# Patient Record
Sex: FEMALE | Race: WHITE
[De-identification: names, ages, dates, MRNs, and addresses within clinical notes are randomized per-mention and may not be internally consistent; named-entity substitution may affect disease eponyms.]

---

## 2017-04-19 ENCOUNTER — HOSPITAL ENCOUNTER (OUTPATIENT)
Dept: HOSPITAL 45 - C.MAMM | Age: 57
Discharge: HOME | End: 2017-04-19
Attending: OBSTETRICS & GYNECOLOGY
Payer: COMMERCIAL

## 2017-04-19 DIAGNOSIS — Z12.31: Primary | ICD-10-CM

## 2017-04-20 NOTE — MAMMOGRAPHY REPORT
BILATERAL DIGITAL SCREENING MAMMOGRAM TOMOSYNTHESIS WITH CAD: 4/19/2017

CLINICAL HISTORY: Routine screening.  Patient has no complaints.  





TECHNIQUE:  Breast tomosynthesis in addition to standard 2D mammography was performed. Current study
 was also evaluated with a Computer Aided Detection (CAD) system.  



COMPARISON: Comparison is made to exams dated:  1/28/2015 mammogram, 1/23/2014 mammogram, 12/5/2012 
mammogram, 10/18/2010 mammogram - [facility], 3/17/2009, and 3/11/2009.   



BREAST COMPOSITION:  There are scattered areas of fibroglandular density in both breasts.  



FINDINGS:  No suspicious masses, calcifications, or areas of architectural distortion are noted in e
ither breast. There has been no significant interval change compared to prior exams.  Small fluctuat
ing partially circumscribed and partially obscured benign-appearing masses are again noted bilateral
ly, and likely represent cysts given the fluctuating nature.







IMPRESSION:  ACR BI-RADS CATEGORY 2: BENIGN

There is no mammographic evidence of malignancy. A 1 year screening mammogram is recommended.  The p
atient will receive written notification of the results.  





Approximately 10% of breast cancers are not detected with mammography. A negative mammographic repor
t should not delay biopsy if a clinically suggestive mass is present.



Caren Hall M.D.          

ah/:4/19/2017 16:43:10  



Imaging Technologist: Ezio CARTER(R)(M), [facility]

letter sent: Normal 1/2  

BI-RADS Code: ACR BI-RADS Category 2: Benign

## 2018-01-22 ENCOUNTER — HOSPITAL ENCOUNTER (OUTPATIENT)
Dept: HOSPITAL 45 - C.PAPS | Age: 58
Discharge: HOME | End: 2018-01-22
Attending: OBSTETRICS & GYNECOLOGY
Payer: COMMERCIAL

## 2018-01-22 DIAGNOSIS — Z01.419: Primary | ICD-10-CM

## 2018-04-24 ENCOUNTER — HOSPITAL ENCOUNTER (OUTPATIENT)
Dept: HOSPITAL 45 - C.MAMM | Age: 58
Discharge: HOME | End: 2018-04-24
Attending: OBSTETRICS & GYNECOLOGY
Payer: COMMERCIAL

## 2018-04-24 DIAGNOSIS — N63.11: ICD-10-CM

## 2018-04-24 DIAGNOSIS — Z12.31: Primary | ICD-10-CM

## 2018-04-25 NOTE — MAMMOGRAPHY REPORT
BILATERAL DIGITAL SCREENING MAMMOGRAM TOMOSYNTHESIS WITH CAD: 4/24/2018

CLINICAL HISTORY: Routine screening.  Patient has no complaints.  





TECHNIQUE:  Breast tomosynthesis in addition to standard 2D mammography was performed. Current study 
was also evaluated with a Computer Aided Detection (CAD) system.  



COMPARISON: Comparison is made to exams dated:  4/19/2017 mammogram, 1/28/2015 mammogram, 1/23/2014 m
ammogram, 12/5/2012 mammogram, 10/18/2010 mammogram - [facility], and 3/11/2009.   




BREAST COMPOSITION:  There are scattered areas of fibroglandular density in both breasts.  



FINDINGS:  There is a dominant 11 mm non-circumscribed mass in the upper outer middle to posterior ri
ght breast, for which additional targeted ultrasound and possible additional mammographic views are r
ecommended.



There are fluctuating masses throughout the remainder of both breasts, mostly decreased throughout th
e left breast, most likely representing fluctuating cysts. No other suspicious mass, architectural di
stortion or cluster of microcalcifications is seen.  



IMPRESSION:  ACR BI-RADS CATEGORY 0: INCOMPLETE EVALUATION:  NEED ADDITIONAL IMAGING EVALUATION

The 11 mm non-circumscribed mass in the right upper outer breast needs additional evaluation.  

The patient will be called to schedule an appointment.  





Approximately 10% of breast cancers are not detected with mammography. A negative mammographic report
 should not delay biopsy if a clinically suggestive mass is present.



Rose Ingram M.D.          

ay/:4/24/2018 16:38:16  



Imaging Technologist: Soumya CARTER(DORIS)(M), [facility]

letter sent: Addl Imaging 0  

BI-RADS Code: ACR BI-RADS Category 0: Incomplete Evaluation:  Need Additional Imaging Evaluation

## 2018-05-07 ENCOUNTER — HOSPITAL ENCOUNTER (OUTPATIENT)
Dept: HOSPITAL 45 - C.MAMM | Age: 58
Discharge: HOME | End: 2018-05-07
Attending: OBSTETRICS & GYNECOLOGY
Payer: COMMERCIAL

## 2018-05-07 DIAGNOSIS — N63.10: Primary | ICD-10-CM

## 2018-05-07 NOTE — MAMMOGRAPHY REPORT
ULTRASOUND OF RIGHT BREAST: 5/7/2018

CLINICAL HISTORY: Callback from screening mammography for an 11 mm non-circumscribed mass in the uppe
r outer quadrant the right breast.  Family history of breast cancer = sister.  





COMPARISON: Comparison is made to exams dated:  10/18/2010 mammogram - [facility], 
3/17/2009, 12/5/2012 mammogram, 1/23/2014 mammogram, 1/28/2015 mammogram, and 4/19/2017 mammogram - [facility].   



FINDINGS: Targeted ultrasound was performed in the upper outer quadrant of the right breast.  In the 
10:00 axis, 5 cm from the nipple, there is a parallel lobulated mixed isoechoic and anechoic partiall
y cystic and partially solid mass measuring 11.1 x 4.1 x 8.0 mm.  Although this could represent a steph
rocyst cluster or complicated cyst is indeterminate, definitive characterization with ultrasound-guid
ed core biopsy is recommended to exclude a mixed solid and cystic mass.





IMPRESSION: ACR BI-RADS CATEGORY 4: SUSPICIOUS - FOLLOW-UP RECOMMENDED

1.  Ultrasound guided core biopsy is recommended for an indeterminate 11 mm possible solid and cystic
 mass in the 10:00 right breast, 5 cm from the nipple, thought to correlate with the 11 mm mammograph
ic mass.



These results and recom recommendations were discussed with the patient at the time of the exam.  She
 tentatively schedule the biopsy prior to leaving our department.



Rose Ingram M.D.  

ay/:5/7/2018 10:06:54  



Imaging Technologist: Dr. Rose Ingram, [facility]

letter sent: Abnormal 4/5  

BI-RADS Code: ACR BI-RADS Category 4: Suspicious

## 2018-05-15 ENCOUNTER — HOSPITAL ENCOUNTER (OUTPATIENT)
Dept: HOSPITAL 45 - C.MAMM | Age: 58
Discharge: HOME | End: 2018-05-15
Attending: OBSTETRICS & GYNECOLOGY
Payer: COMMERCIAL

## 2018-05-15 DIAGNOSIS — N63.10: ICD-10-CM

## 2018-05-15 DIAGNOSIS — R92.8: Primary | ICD-10-CM

## 2018-05-15 NOTE — DISCHARGE INSTRUCTIONS
Discharge Instructions


Procedure


Procedure Date:


May 15, 2018.


Reason for visit:


Right Mass.





Discharge


Discharge Date:


May 15, 2018.


Discharge Diagnosis:


post right breast ultrasound guided core biopsy





Instructions


Activity Recommendations:  Additional Limitations (see below)


Return to School/Work:  no limitations


Recommended Home Diet:  No Limitations


Provider Instructions:





ACTIVITY RECOMMENDATIONS:





*  No lifting, pushing, pulling or exercising the affected side for three days.








RETURN TO SCHOOL/WORK:





*  You may return to work/school after the procedure, but do not perform any 

strenuous


   activities for 24 to 48 hours.








MEDICATIONS:





*  Tylenol (two 325 mg) every four to six hours if needed for mild pain (if not 

allergic to Tylenol).








DIET:





*  Resume previous diet.








SPECIAL CARE INSTRUCTIONS:





*  Keep biopsy site dry for 24 hours.  May shower after 24 hours, but do not 

soak (bathe)


   incision.





*  May remove Tegaderm (plastic patch) tomorrow AFTER showering.





*  Leave the steri-strips on for one week.  Allow the steri-strips to fall off 

by themselves.  


   If not off after one week, you may remove them.  You may place a Bandaid


   crosswise over the strips, if desired.





*  Apply ice 10 minutes on and 10 minutes off as needed.





*  Wear a bra at bedtime to sleep more comfortably for 2-3 days.





*  Your referring physician should have the results after approximately 5 to 7 

business days.





*  Call for unusual bleeding, fever, drainage, etc or if you have any questions 

call


    [phone] during normal business hours or after hours call Dr Ingram, 

[phone].








FOLLOW UP VISIT:





Follow-up with Referring Physician as scheduled.





Allergies


Coded Allergies:  


     Aspirin (Verified  Allergy, Unknown, GI UPSET, 5/19/16)


     Sulfa Drugs (Unverified  Allergy, Unknown, HIVES, 5/19/16)


     Erythromycin (Unverified  Adverse Reaction, Unknown, GI UPSET, 5/19/16)


 PER PT GI UPSET





Agustin Schuster Recommendations:


 


Call your doctor if:





*  Temperature above 101 degrees


*  Pain not relieved by pain medicine ordered


*  There is increased drainage or redness from any incision


*  You have any unanswered questions or concerns.





Your Doctors Instructions noted above were prepared by provider Rose Ingram.


Patient Signature Section:





 Patient Instructions Signature Page














Ruma Jasmine 











Patient (or Guardian) Signature/Date:____________________________________ I 

have read and understand the instructions given to me by my caregivers.








Caregiver/RN/Doctor Signature/Date:____________________________________











The above-named patient and/or guardian has received patient instructions on 

this date.





























+  Original Patient Signature Page (only) stays with chart.  Please make copy 

for patient.

## 2018-05-15 NOTE — MAMMOGRAPHY REPORT
UNILATERAL RIGHT DIGITAL DIAGNOSTIC MAMMOGRAM TOMOSYNTHESIS: 5/15/2018

CLINICAL HISTORY: Status post ultrasound-guided core biopsy of a lobulated mixed echogenicity possibl
e solid and cystic 11 mm mass in the 10:00 right breast.  



Please refer the report from right breast ultrasound-guided core biopsy performed at the same time fo
r full detail.

IMPRESSION:  POST PROCEDURE IMAGING FOR MARKER PLACEMENT

Please refer the report from right breast ultrasound-guided core biopsy performed at the same time fo
r full detail.



Approximately 10% of breast cancers are not detected with mammography. A negative mammographic report
 should not delay biopsy if a clinically suggestive mass is present.



Rose Ingram M.D.          

ay/:5/15/2018 14:23:21  



Imaging Technologist: Ezio CARTER(R)(M), [facility]



BI-RADS Code: Post Procedure Imaging For Marker Placement

## 2018-05-15 NOTE — MAMMOGRAPHY REPORT
THIS REPORT HAS BEEN AMENDED.  

ULTRASOUND GUIDED BIOPSY RIGHT BREAST: 5/15/2018

CLINICAL HISTORY: 58-year-old woman presents for ultrasound-guided core biopsy of an indeterminate mi
xed echogenicity 11 mm mass in the 10:00 right breast, thought to correlate with a dominant mammograp
hic mass.  



COMPARISON: Comparison is made to exams dated:  5/7/2018 ultrasound, 4/24/2018 mammogram, 4/19/2017 m
ammogram, 1/28/2015 mammogram, 1/23/2014 mammogram, and 12/5/2012 mammogram - [facility]
enter. 



PATIENT CONSENT: The procedure, risks and benefits were discussed with the patient and informed conse
nt was obtained both verbally and in writing.  Specific risks to this procedure include: bleeding, in
fection, puncture of adjacent structure, nontarget biopsy, sampling error, pain, metal allergy and me
dication reaction.  



PROCEDURE DESCRIPTION: A time out was performed and the right breast was agreed as the site of biopsy
. The skin was prepped and draped in the usual sterile fashion. The 11 mm mixed echogenicity mass in 
the 10:00 right breast was chosen as the target for biopsy. Subcutaneous and intraparenchymal 1% buff
ered lidocaine, with and without epinephrine, was administered as local anesthesia. A skin incision w
as made.  Through the incision, 3 samples were taken with a 14 gauge Achieve biopsy device.  The mass
 was becoming less visible after each core biopsy sample.  A ribbon shaped metallic marker was placed
 at the biopsy site. Hemostasis was achieved after manual compression. The patient tolerated the proc
edure well and there was no immediate complication.  [facility]  



Postprocedure right CC and ML tomosynthesis images were obtained.  There is a new ribbon-shaped biops
y marker clip in the upper outer posterior right breast, at the site of the biopsied 11 mm mass.  The
 biopsy marker clip is noted along the posterior margin of the mass, likely given that it was no long
er visible sonographically after the third core biopsy sample.  No significant postbiopsy hematoma is
 seen.



IMPRESSION: ULTRASOUND GUIDED BIOPSY

Status post ultrasound-guided core biopsy of a mixed echogenicity 11 mm mass [facility] the 10:[facility], with ribbon-shaped biopsy marker clip placed at the site.



The patient will receive notification of the biopsy results from her referring physician.







Rose Ingram M.D.  

ay/:5/15/2018 14:37:57  



Attending Technologist: Ezio GRANT)ARMIN), [facility]

Imaging Technologist: Dr. Rose Ingram, [facility]









AMENDMENT: 5/16/2018   Rose Ingram M.D. 

Pathology results from the ultrasound-guided core biopsy of the mixed echogenicity mass in the 10:00 
right breast yielded benign breast tissue with multiloculated cyst with apocrine metaplasia.  The pat
hology results are concordant with the imaging appearance.  Given that this mass and associated biops
y marker clip correlated with the dominant lobulated mammographic mass, no further close follow-up is
 needed at this time and would recommend return to annual screening mammography schedule.